# Patient Record
Sex: FEMALE | Race: WHITE | NOT HISPANIC OR LATINO | Employment: FULL TIME | URBAN - METROPOLITAN AREA
[De-identification: names, ages, dates, MRNs, and addresses within clinical notes are randomized per-mention and may not be internally consistent; named-entity substitution may affect disease eponyms.]

---

## 2018-04-21 ENCOUNTER — WALK IN (OUTPATIENT)
Dept: URGENT CARE | Age: 18
End: 2018-04-21

## 2018-04-21 ENCOUNTER — IMAGING SERVICES (OUTPATIENT)
Dept: GENERAL RADIOLOGY | Age: 18
End: 2018-04-21
Attending: FAMILY MEDICINE

## 2018-04-21 VITALS
OXYGEN SATURATION: 100 % | HEART RATE: 58 BPM | RESPIRATION RATE: 16 BRPM | DIASTOLIC BLOOD PRESSURE: 60 MMHG | SYSTOLIC BLOOD PRESSURE: 98 MMHG | TEMPERATURE: 97.9 F

## 2018-04-21 DIAGNOSIS — M79.672 LEFT FOOT PAIN: Primary | ICD-10-CM

## 2018-04-21 DIAGNOSIS — M25.572 ACUTE LEFT ANKLE PAIN: ICD-10-CM

## 2018-04-21 DIAGNOSIS — M79.672 LEFT FOOT PAIN: ICD-10-CM

## 2018-04-21 PROCEDURE — 73610 X-RAY EXAM OF ANKLE: CPT | Performed by: RADIOLOGY

## 2018-04-21 PROCEDURE — 99214 OFFICE O/P EST MOD 30 MIN: CPT | Performed by: FAMILY MEDICINE

## 2018-04-21 PROCEDURE — 73630 X-RAY EXAM OF FOOT: CPT | Performed by: RADIOLOGY

## 2022-09-07 ENCOUNTER — WALK IN (OUTPATIENT)
Dept: URGENT CARE | Age: 22
End: 2022-09-07

## 2022-09-07 VITALS
HEART RATE: 54 BPM | RESPIRATION RATE: 18 BRPM | BODY MASS INDEX: 21.69 KG/M2 | TEMPERATURE: 98.3 F | DIASTOLIC BLOOD PRESSURE: 70 MMHG | HEIGHT: 66 IN | OXYGEN SATURATION: 100 % | SYSTOLIC BLOOD PRESSURE: 114 MMHG | WEIGHT: 135 LBS

## 2022-09-07 DIAGNOSIS — J06.9 UPPER RESPIRATORY TRACT INFECTION, UNSPECIFIED TYPE: ICD-10-CM

## 2022-09-07 DIAGNOSIS — Z20.822 SUSPECTED COVID-19 VIRUS INFECTION: Primary | ICD-10-CM

## 2022-09-07 LAB
FLUAV RNA RESP QL NAA+PROBE: NOT DETECTED
FLUBV RNA RESP QL NAA+PROBE: NOT DETECTED
SARS-COV-2 N GENE CT SPEC QN NAA N2: ABNORMAL
SARS-COV-2 RNA RESP QL NAA+PROBE: DETECTED
SERVICE CMNT-IMP: ABNORMAL

## 2022-09-07 PROCEDURE — 99203 OFFICE O/P NEW LOW 30 MIN: CPT | Performed by: PHYSICIAN ASSISTANT

## 2022-09-07 PROCEDURE — 0240U SARS-COV-2/INFLUENZA BY PCR: CPT | Performed by: INTERNAL MEDICINE

## 2022-09-07 RX ORDER — BENZONATATE 200 MG/1
200 CAPSULE ORAL 3 TIMES DAILY PRN
Qty: 30 CAPSULE | Refills: 0 | Status: SHIPPED | OUTPATIENT
Start: 2022-09-07

## 2023-01-30 ENCOUNTER — WALK IN (OUTPATIENT)
Dept: URGENT CARE | Age: 23
End: 2023-01-30

## 2023-01-30 ENCOUNTER — IMAGING SERVICES (OUTPATIENT)
Dept: GENERAL RADIOLOGY | Age: 23
End: 2023-01-30
Attending: FAMILY MEDICINE

## 2023-01-30 VITALS
TEMPERATURE: 97.4 F | SYSTOLIC BLOOD PRESSURE: 98 MMHG | OXYGEN SATURATION: 98 % | BODY MASS INDEX: 21.69 KG/M2 | HEIGHT: 66 IN | WEIGHT: 135 LBS | HEART RATE: 55 BPM | RESPIRATION RATE: 16 BRPM | DIASTOLIC BLOOD PRESSURE: 60 MMHG

## 2023-01-30 DIAGNOSIS — M79.671 RIGHT FOOT PAIN: ICD-10-CM

## 2023-01-30 DIAGNOSIS — M79.671 RIGHT FOOT PAIN: Primary | ICD-10-CM

## 2023-01-30 PROCEDURE — 99214 OFFICE O/P EST MOD 30 MIN: CPT | Performed by: FAMILY MEDICINE

## 2023-01-30 PROCEDURE — 73630 X-RAY EXAM OF FOOT: CPT | Performed by: RADIOLOGY

## 2023-01-30 RX ORDER — ALBUTEROL SULFATE 90 UG/1
AEROSOL, METERED RESPIRATORY (INHALATION)
COMMUNITY
Start: 2022-12-29

## 2023-01-30 ASSESSMENT — PAIN SCALES - GENERAL: PAINLEVEL: 8

## 2024-09-10 ENCOUNTER — TELEPHONE (OUTPATIENT)
Dept: ORTHOPEDICS | Age: 24
End: 2024-09-10

## 2024-09-10 ENCOUNTER — APPOINTMENT (OUTPATIENT)
Dept: ORTHOPEDICS | Age: 24
End: 2024-09-10

## 2024-09-10 ASSESSMENT — ENCOUNTER SYMPTOMS
NERVOUS/ANXIOUS: 0
CONSTIPATION: 0
SHORTNESS OF BREATH: 0
SINUS PAIN: 0
RHINORRHEA: 0
CHILLS: 0
CHOKING: 0
NAUSEA: 0
BLOOD IN STOOL: 0
EYE REDNESS: 0
UNEXPECTED WEIGHT CHANGE: 0
NUMBNESS: 0
DIZZINESS: 0
PHOTOPHOBIA: 0
DIAPHORESIS: 0
BACK PAIN: 0
ACTIVITY CHANGE: 0
AGITATION: 0
CHEST TIGHTNESS: 0
SPEECH DIFFICULTY: 0
ABDOMINAL PAIN: 0
COUGH: 0
EYE PAIN: 0
HEADACHES: 0
FATIGUE: 0

## 2024-09-12 ENCOUNTER — OFFICE VISIT (OUTPATIENT)
Dept: SPORTS MEDICINE | Age: 24
End: 2024-09-12

## 2024-09-12 ENCOUNTER — IMAGING SERVICES (OUTPATIENT)
Dept: GENERAL RADIOLOGY | Age: 24
End: 2024-09-12
Attending: FAMILY MEDICINE

## 2024-09-12 ENCOUNTER — HOSPITAL ENCOUNTER (OUTPATIENT)
Age: 24
Discharge: HOME | End: 2024-09-12
Payer: COMMERCIAL

## 2024-09-12 VITALS
HEART RATE: 61 BPM | OXYGEN SATURATION: 100 % | DIASTOLIC BLOOD PRESSURE: 71 MMHG | TEMPERATURE: 98 F | RESPIRATION RATE: 18 BRPM | SYSTOLIC BLOOD PRESSURE: 137 MMHG

## 2024-09-12 VITALS
DIASTOLIC BLOOD PRESSURE: 82 MMHG | SYSTOLIC BLOOD PRESSURE: 130 MMHG | WEIGHT: 126 LBS | HEART RATE: 54 BPM | BODY MASS INDEX: 20.25 KG/M2 | HEIGHT: 66 IN

## 2024-09-12 DIAGNOSIS — M84.359A STRESS FRACTURE OF HIP, INITIAL ENCOUNTER: Primary | ICD-10-CM

## 2024-09-12 DIAGNOSIS — M25.551 BILATERAL HIP PAIN: ICD-10-CM

## 2024-09-12 DIAGNOSIS — M53.3 COCCYX PAIN: Primary | ICD-10-CM

## 2024-09-12 DIAGNOSIS — M25.552 BILATERAL HIP PAIN: ICD-10-CM

## 2024-09-12 PROCEDURE — 73522 X-RAY EXAM HIPS BI 3-4 VIEWS: CPT | Performed by: FAMILY MEDICINE

## 2024-09-12 PROCEDURE — 99204 OFFICE O/P NEW MOD 45 MIN: CPT | Performed by: FAMILY MEDICINE

## 2024-09-12 PROCEDURE — 99204 OFFICE O/P NEW MOD 45 MIN: CPT | Performed by: NURSE PRACTITIONER

## 2024-09-12 RX ORDER — HYDROCODONE BITARTRATE AND ACETAMINOPHEN 5; 325 MG/1; MG/1
1 TABLET ORAL EVERY 6 HOURS PRN
Qty: 10 TABLET | Refills: 0 | Status: SHIPPED | OUTPATIENT
Start: 2024-09-12

## 2024-09-12 RX ORDER — METHYLPREDNISOLONE 4 MG
TABLET, DOSE PACK ORAL
Qty: 1 EACH | Refills: 0 | Status: SHIPPED | OUTPATIENT
Start: 2024-09-12

## 2024-09-12 NOTE — ED PROVIDER NOTES
Patient Seen in: [facility]      History     Chief Complaint   Patient presents with    Pain     Stated Complaint: lower back pain/tailbone    Subjective:   24-year-old female presents today with complaints of pain to the coccyx area.  Was seen at another facility earlier today and did have an x-ray to the coccyx area but system was down and states cannot get prescriptions for medication.  Results of the x-ray was given to her through [employer].  Continues to have pain has been trying over the counter medications without any relief.  States symptoms started after running denies any injuries to the area.  No history of falls.            Objective:   No pertinent past medical history.            No pertinent past surgical history.              No pertinent social history.            Review of Systems    Positive for stated Chief Complaint: Pain    Other systems are as noted in HPI.  Constitutional and vital signs reviewed.      All other systems reviewed and negative except as noted above.    Physical Exam     ED Triage Vitals [09/12/24 1848]   /71   Pulse 61   Resp 18   Temp 98.1 °F (36.7 °C)   Temp src Temporal   SpO2 100 %   O2 Device None (Room air)       Current Vitals:   Vital Signs  BP: 137/71  Pulse: 61  Resp: 18  Temp: 98.1 °F (36.7 °C)  Temp src: Temporal    Oxygen Therapy  SpO2: 100 %  O2 Device: None (Room air)            Physical Exam  Vitals and nursing note reviewed.   Constitutional:       Appearance: Normal appearance.   HENT:      Head: Normocephalic.      Mouth/Throat:      Mouth: Mucous membranes are moist.   Cardiovascular:      Rate and Rhythm: Normal rate.   Pulmonary:      Effort: Pulmonary effort is normal.   Musculoskeletal:      Comments: Mild pain with palpation over the coccyx area no surrounding redness or swelling no obvious abscess or pilonidal cyst noted.   Skin:     General: Skin is warm and dry.   Neurological:      Mental Status: She is alert and oriented to person,  place, and time.               ED Course   Labs Reviewed - No data to display                 MDM                                      Medical Decision Making  Differential diagnosis includes but is not limited to: Coccyx fracture, coccyx strain, pilonidal cyst, abscess        Presents today with complaints of pain to the coccyx area without specific injury.  Symptoms started after a long run.  On exam no signs of infection or pilonidal cyst noted.  He does have some tenderness with palpation no fluctuance.  X-ray was done earlier today which was normal.  Patient here for pain control.  Patient given prescription of Medrol dose pack.  Patient courage to take over-the-counter naproxen low-dose Tylenol prescription for Norco was given with discussion on side effects and addictive properties.  Patient follow-up primary care physician 1 week if symptoms do not improve.  Patient verbalized understanding and agreed with plan of care.    Risk  OTC drugs.  Prescription drug management.        Disposition and Plan     Clinical Impression:  1. Coccyx pain         Disposition:  Discharge  9/12/2024  6:55 pm    Follow-up:  Sabrina Segura MD  [address] [id number] [phone]    In 1 week  As needed          Medications Prescribed:  Current Discharge Medication List        START taking these medications    Details   methylPREDNISolone (MEDROL) 4 MG Oral Tablet Therapy Pack Dosepack: take as directed  Qty: 1 each, Refills: 0      HYDROcodone-acetaminophen 5-325 MG Oral Tab Take 1 tablet by mouth every 6 (six) hours as needed for Pain.  Qty: 10 tablet, Refills: 0    Associated Diagnoses: Coccyx pain

## 2024-09-12 NOTE — ED INITIAL ASSESSMENT (HPI)
Pt here w/ c/o tailbone pain. Pt was jogging and was [facility] but their systems were down do she was unable to get anything for pain control. States ibuprofen isnt controlling it.

## 2024-09-12 NOTE — DISCHARGE INSTRUCTIONS
Ice to area of soreness.  Continue take over-the-counter ibuprofen or naproxen for pain and swelling.  Take Medrol Dosepak in the morning with food to help with inflammation.  Take naproxen and Medrol Dosepak at least 2 hours apart.  Again take both with food.  Rest.  Do not run for at least 1 week.  Take Norco for acute pain.  Norco is an opiate therefore addictive so use sparingly.  Can also cause constipation so use a stool softener and do not drive while taking.

## 2024-10-26 ENCOUNTER — OFFICE VISIT (OUTPATIENT)
Facility: CLINIC | Age: 24
End: 2024-10-26
Payer: COMMERCIAL

## 2024-10-26 VITALS
HEART RATE: 56 BPM | WEIGHT: 127 LBS | HEIGHT: 66 IN | SYSTOLIC BLOOD PRESSURE: 98 MMHG | BODY MASS INDEX: 20.41 KG/M2 | DIASTOLIC BLOOD PRESSURE: 70 MMHG

## 2024-10-26 DIAGNOSIS — M62.89 PELVIC FLOOR DYSFUNCTION IN FEMALE: Primary | ICD-10-CM

## 2024-10-26 PROCEDURE — 99202 OFFICE O/P NEW SF 15 MIN: CPT

## 2024-10-26 PROCEDURE — 3078F DIAST BP <80 MM HG: CPT

## 2024-10-26 PROCEDURE — 3074F SYST BP LT 130 MM HG: CPT

## 2024-10-26 PROCEDURE — 3008F BODY MASS INDEX DOCD: CPT

## 2024-10-26 NOTE — PROGRESS NOTES
Ema Mejias is a 24 year old female  Patient's last menstrual period was 10/24/2024 (exact date).   Chief Complaint   Patient presents with    New Patient     Establish care     Gyn Problem    Pelvic Pain     Has been having pelvic pain for a few months. Tried physical therapy and that didn't help much    .  After she ran the [place] this year, she is having pain from her buttocks to her vagina. Is seeing physical therapy for her lower back.   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    Congenital absence of hand    left    Extrinsic asthma, unspecified    Osteoporosis    Left hip/sacrum       SURGICAL HISTORY:  Past Surgical History:   Procedure Laterality Date    Other surgical history  adenoidectomy    Other surgical history  adenoidectomy       SOCIAL HISTORY:  Social History     Socioeconomic History    Marital status: Single     Spouse name: Not on file    Number of children: Not on file    Years of education: Not on file    Highest education level: Not on file   Occupational History    Not on file   Tobacco Use    Smoking status: Never    Smokeless tobacco: Never   Substance and Sexual Activity    Alcohol use: No    Drug use: No    Sexual activity: Not on file   Other Topics Concern    Caffeine Concern No    Exercise Yes     Comment: Marathon runner    Seat Belt No    Special Diet No    Stress Concern No    Weight Concern No   Social History Narrative    Not on file     Social Drivers of Health     Financial Resource Strain: Not on file   Food Insecurity: Low Risk  (2023)    Received from [facility]    Food Insecurity     Have there been times  that your food ran out, and you didn't have money to get more?: No     Are there times that you worry that this might happen?: No   Transportation Needs: Low Risk  (9/4/2023)    Received from [facility]    Transportation Needs     Do you have trouble getting transportation to medical appointments?: No     How do you normally get to and from your appointments?: Other   Physical Activity: Not on file   Stress: Not on file   Social Connections: Unknown (8/25/2024)    Received from [facility]    Social Connections     Social Connections: Last Flowsheet Data: Not on file     Social Connections: Recent Result: Not on file   Housing Stability: Not on file (9/4/2023)       FAMILY HISTORY:  History reviewed. No pertinent family history.    MEDICATIONS:    Current Outpatient Medications:     methylPREDNISolone (MEDROL) 4 MG Oral Tablet Therapy Pack, Dosepack: take as directed, Disp: 1 each, Rfl: 0    HYDROcodone-acetaminophen 5-325 MG Oral Tab, Take 1 tablet by mouth every 6 (six) hours as needed for Pain., Disp: 10 tablet, Rfl: 0    ALLERGIES:  Allergies[1]      PHYSICAL EXAM:   Pelvic Exam:  External Genitalia: normal appearance, hair distribution, and no lesions  Urethral Meatus:  normal in size, location, without lesions and prolapse  Bladder:  No fullness, masses or tenderness  Vagina:  Normal appearance without lesions, no abnormal discharge  Cervix:  Normal without tenderness on motion  Uterus: normal in size, contour, position, mobility, without tenderness  Adnexa: normal without masses or tenderness  Perineum: normal  Anus: no hemorroids     Assessment & Plan:    1. Pelvic floor dysfunction in female    - Pelvic Floor Therapy - [facility] - External               [1]   Allergies  Allergen Reactions    Penicillins HIVES

## 2024-12-08 ENCOUNTER — HOSPITAL ENCOUNTER (OUTPATIENT)
Age: 24
Discharge: HOME | End: 2024-12-08
Payer: COMMERCIAL

## 2024-12-08 VITALS
HEART RATE: 50 BPM | SYSTOLIC BLOOD PRESSURE: 125 MMHG | BODY MASS INDEX: 20.25 KG/M2 | WEIGHT: 126 LBS | HEIGHT: 66 IN | RESPIRATION RATE: 18 BRPM | DIASTOLIC BLOOD PRESSURE: 80 MMHG | TEMPERATURE: 98 F | OXYGEN SATURATION: 100 %

## 2024-12-08 DIAGNOSIS — M54.31 SCIATICA OF RIGHT SIDE: Primary | ICD-10-CM

## 2024-12-08 RX ORDER — PREDNISONE 20 MG/1
60 TABLET ORAL ONCE
Status: COMPLETED | OUTPATIENT
Start: 2024-12-08 | End: 2024-12-08

## 2024-12-08 RX ORDER — METHYLPREDNISOLONE 4 MG/1
TABLET ORAL
Qty: 1 EACH | Refills: 0 | Status: SHIPPED | OUTPATIENT
Start: 2024-12-08

## 2024-12-08 NOTE — ED INITIAL ASSESSMENT (HPI)
Right glute and hip pain for a couple of months. This is an ongoing issue for patient. Patient has had an MRI and xray done. Pt is currently in PT. Last visit was last Thursday. Pain is getting worse since Tuesday. Rates it 9/10 right now.

## 2024-12-08 NOTE — ED PROVIDER NOTES
Patient Seen in: [facility]      History     Chief Complaint   Patient presents with    Hip Pain     Stated Complaint: glute and pelvis pain    Subjective:   HPI      Pt is a 24yr old female with asthma, who is being treated for a glutal pain that started a few months ago.  Has been seen by pmd, orthopedics, and was doing pt.  Started having pain Tuesday that was worsening.  Was at PT Thursday and given exercises to do.  She reports that the pain is not getting better.     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    Congenital absence of hand    left    Extrinsic asthma, unspecified    Osteoporosis    Left hip/sacrum              Past Surgical History:   Procedure Laterality Date    Other surgical history  adenoidectomy    Other surgical history  adenoidectomy                Social History     Socioeconomic History    Marital status: Single   Tobacco Use    Smoking status: Never    Smokeless tobacco: Never   Substance and Sexual Activity    Alcohol use: No    Drug use: No   Other Topics Concern    Caffeine Concern No    Exercise Yes     Comment: Marathon runner    Seat Belt No    Special Diet No    Stress Concern No    Weight Concern No     Social Drivers of Health     Food Insecurity: Low Risk  (9/4/2023)    Received from [facility]    Food Insecurity     Have there been times that your food ran out, and you didn't have money to get more?: No     Are there times that you worry that this might happen?: No   Transportation Needs: Low Risk  (9/4/2023)    Received from [facility]    Transportation Needs     Do you have trouble getting transportation to medical appointments?: No     How do you normally get to and from your appointments?: Other    Received from [facility]    Social Connections              Review of Systems    Positive for stated complaint: glute and pelvis  pain  Other systems are as noted in HPI.  Constitutional and vital signs reviewed.      All other systems reviewed and negative except as noted above.    Physical Exam     ED Triage Vitals [12/08/24 1505]   /80   Pulse 50   Resp 18   Temp 97.5 °F (36.4 °C)   Temp src Oral   SpO2 100 %   O2 Device None (Room air)       Current Vitals:   Vital Signs  BP: 125/80  Pulse: 50  Resp: 18  Temp: 97.5 °F (36.4 °C)  Temp src: Oral    Oxygen Therapy  SpO2: 100 %  O2 Device: None (Room air)        Physical Exam  VS: Vital signs reviewed. O2 saturation within normal limits for this patient     General: Patient is awake and alert, oriented to person, place and time. Not in acute distress.      HEENT: Head is normocephalic atraumatic. Pupils reactive bilaterally.  EOMs intact.  No facial droop or slurred speech.  No oral pallor. Mucous membranes moist.      Neck: No cervical lymphadenopathy. No stridor. Supple. No meningsmus.      Heart: S1-S2.  Regular rate and rhythm.       Lungs: No respiratory distress noted    Abdomen: Soft, nontender, nondistended.  Active bowel sounds present.       Back: No CVA tenderness.     Extremities: No edema.  Pulses 2+ extremities.   Brisk capillary refill noted.      Skin: Normal skin turgor     CNS: Moves all 4 extremities.  Interacts appropriately.  No tremor.  No gait abnormality          ED Course   Labs Reviewed - No data to display         I have personally  reviewed available prior medical records for any recent pertinent discharge summaries/testing. Patient/family updated on results and plan, a verbalized understanding and agreement with the plan.  I explained to the patient that emergent conditions may arise and to go to the ER for new, worsening or any persistent conditions. I've explained the importance of taking all medicatons as prescribed, follow up, and return precuations,  All questions answered.    Please note that this report has been produced using speech recognition  software and may contain errors related to that system including, but not limited to, errors in grammar, punctuation, and spelling, as well as words and phrases that possibly may have been recognized inappropriately.  If there are any questions or concerns, contact the dictating provider for clarification.       MDM      Patient is well-appearing, well-hydrated, well-nourished, nontoxic, there is no distress noted.  Patient is ambulatory with a steady gait.  She has what appears to be a sciatica pain.  She has no back pain at this time.  It is all in her right [place].  Patient reports that she had this pain in the past, she was treated with prednisone which helped to improve the symptoms.  Patient reports that when she was seen at physical therapy on Thursday, they told her to continue doing exercises for the pain.  Denies any new injuries.  Patient discharged home in stable condition with no neurofocal deficits to take prednisone as directed.  Follow-up with primary care physician, sports medicine, physical therapy and return to the emergency department with any worsening symptoms or concerns        Medical Decision Making      Disposition and Plan     Clinical Impression:  1. Sciatica of right side         Disposition:  Discharge  12/8/2024  3:14 pm    Follow-up:  Sabrina Segura MD  [address]  [facility] [id number] [phone]                Medications Prescribed:  Current Discharge Medication List        START taking these medications    Details   !! methylPREDNISolone (MEDROL) 4 MG Oral Tablet Therapy Pack Dosepack: take as directed  Qty: 1 each, Refills: 0       !! - Potential duplicate medications found. Please discuss with provider.              Supplementary Documentation:

## 2025-02-10 NOTE — PROGRESS NOTES
Chief Complaint   Patient presents with    Physical     No concerns     Decline weight check          HPI  Ema Mejias is a 25 year old female here for new patient visit and  physical.    Last colon cancer screen:  n/a    Last mammo: -n/a    Last Pap: -due    Acute concerns: none    Discussed:  - diet: no red meat, 3 meal/day x 3 snack. 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    Congenital absence of hand    left    Extrinsic asthma, unspecified    Osteoporosis    Left hip/sacrum       Past Surgical History:   Procedure Laterality Date    Other surgical history  adenoidectomy    Other surgical history  adenoidectomy       Social History     Socioeconomic History    Marital status: Single   Tobacco Use    Smoking status: Never     Passive exposure: Never    Smokeless tobacco: Never   Vaping Use    Vaping status: Never Used   Substance and Sexual Activity    Alcohol use: No    Drug use: No   Other Topics Concern    Caffeine Concern No    Exercise Yes     Comment: Marathon runner    Seat Belt No    Special Diet No    Stress Concern No    Weight Concern No       History reviewed. No pertinent family history.     Medications Ordered Prior to Encounter[1]    Immunization History   Administered Date(s) Administered    Covid-19 Vaccine Pfizer 30 mcg/0.3 ml 02/22/2021, 03/15/2021    Flucelvax Influenza vaccine, trivalent (ccIIV3), 0.5mL IM 09/07/2024    Influenza 09/20/2011, 12/04/2012    Meningococcal-Menactra 06/23/2014     Pfizer Covid-19 Vaccine 30mcg/0.3ml 12yrs+ 09/07/2024    TD 08/22/2023, 08/22/2023    Tb Intradermal Test 12/11/2016, 11/10/2020            Objective  Vitals:    02/11/25 1536   BP: 118/56   Pulse: 51   Resp: 16   Temp: 97.2 °F (36.2 °C)   SpO2: 98%   Height: 5' 6\" (1.676 m)   Body mass index is 20.34 kg/m².    Physical Exam  Constitutional:       Appearance: Normal appearance.   HEENT:      Head: Normocephalic and atraumatic.      Eyes: PERRLA no notable nystagmus     Ears: normal on observation     Nose: Nose normal.      Mouth: Mucous membranes are moist.      Neck: no lymphadenopathy  Cardiovascular:      Rate and Rhythm: Normal rate and regular rhythm.   Pulmonary:      Effort: Pulmonary effort is normal.      Breath sounds: Normal breath sounds.   Abdominal:      General: Bowel sounds are normal.      Palpations: Abdomen is soft. There is no mass.   Musculoskeletal:         General: Normal range of motion.   Skin:     General: Skin is warm and dry.   Neurological:      General: No focal deficit present.      Mental Status: Alert and oriented to person, place, and time.   Psychiatric:         Mood and Affect: Mood normal.         Thought Content: Thought content normal.       Assessment and Plan  Ema was seen today for physical.    Diagnoses and all orders for this visit:    Annual physical exam  -     TSH W Reflex To Free T4; Future  -     Lipid Panel; Future  -     Comp Metabolic Panel (14); Future  -     CBC With Differential With Platelet; Future    Generalized anxiety disorder  Comments:  GAD7: 15.Cont weekly therapy seesions   Start zoloft 25mg daily, discussed med benefit/risk/alternatives  Pt tolerated zoloft in the past, adjust dose as needed  Orders:  -     sertraline (ZOLOFT) 25 MG Oral Tab; Take 1 tablet (25 mg total) by mouth daily.    Iron deficiency anemia, unspecified iron deficiency anemia type  -     Iron And Tibc [E]; Future  -     Ferritin [E]; Future    Anorexia nervosa  (HCC)  Comments:  Stable, sees dietitian and therapist  No compensatory behaviors  Follow up with psychiatrist as discussed    Mild intermittent asthma without complication (HCC)  Comments:  Stable, no concerns    Congenital deformities of feet  Comments:  Stable, no concerns    Congenital reduction deformity of upper limb  Comments:  Stable, no concerns    Cervical cancer screening  Comments:  return for cervical cancer screening         Patient presents for annual exam  - General labs: ordered, awaiting results  - Discussed signing up for patient portal as means of communicating with me directly  - Discussed importance of communicating with me if has not heard back with results, etc  - Discussed age-appropriate vaccinations and screenings  - Pt verbalizes understanding, all questions/concerns addressed, in agreement w/plan    Follow up  Return in about 4 weeks (around 3/11/2025) for medication follow up.      Patient Instructions  Patient Instructions   Obtain labs  Methods to manage anxiety including: journaling, exercise, meditation, and deep breathing techniques.  Start Zoloft 25mg daily as discussed   Follow up for papsmear as needed          Gay Junior MD          [1]   No current outpatient medications on file prior to visit.     No current facility-administered medications on file prior to visit.

## 2025-02-11 ENCOUNTER — OFFICE VISIT (OUTPATIENT)
Dept: FAMILY MEDICINE CLINIC | Facility: CLINIC | Age: 25
End: 2025-02-11
Payer: COMMERCIAL

## 2025-02-11 VITALS
SYSTOLIC BLOOD PRESSURE: 118 MMHG | BODY MASS INDEX: 20 KG/M2 | DIASTOLIC BLOOD PRESSURE: 56 MMHG | RESPIRATION RATE: 16 BRPM | OXYGEN SATURATION: 98 % | TEMPERATURE: 97 F | HEIGHT: 66 IN | HEART RATE: 51 BPM

## 2025-02-11 DIAGNOSIS — Q71.90: ICD-10-CM

## 2025-02-11 DIAGNOSIS — Z00.00 ANNUAL PHYSICAL EXAM: Primary | ICD-10-CM

## 2025-02-11 DIAGNOSIS — Q66.90: ICD-10-CM

## 2025-02-11 DIAGNOSIS — F41.1 GENERALIZED ANXIETY DISORDER: ICD-10-CM

## 2025-02-11 DIAGNOSIS — F50.00 ANOREXIA NERVOSA (HCC): ICD-10-CM

## 2025-02-11 DIAGNOSIS — J45.20 MILD INTERMITTENT ASTHMA WITHOUT COMPLICATION (HCC): ICD-10-CM

## 2025-02-11 DIAGNOSIS — D50.9 IRON DEFICIENCY ANEMIA, UNSPECIFIED IRON DEFICIENCY ANEMIA TYPE: ICD-10-CM

## 2025-02-11 DIAGNOSIS — Z12.4 CERVICAL CANCER SCREENING: ICD-10-CM

## 2025-02-11 PROBLEM — F41.9 ANXIETY: Status: ACTIVE | Noted: 2023-03-21

## 2025-02-11 PROBLEM — J45.909 ASTHMA (HCC): Status: ACTIVE | Noted: 2022-11-07

## 2025-02-11 PROCEDURE — 99385 PREV VISIT NEW AGE 18-39: CPT

## 2025-02-11 PROCEDURE — 3074F SYST BP LT 130 MM HG: CPT

## 2025-02-11 PROCEDURE — 99213 OFFICE O/P EST LOW 20 MIN: CPT

## 2025-02-11 PROCEDURE — 3078F DIAST BP <80 MM HG: CPT

## 2025-02-11 RX ORDER — SERTRALINE HYDROCHLORIDE 25 MG/1
25 TABLET, FILM COATED ORAL DAILY
Qty: 30 TABLET | Refills: 0 | Status: SHIPPED | OUTPATIENT
Start: 2025-02-11

## 2025-02-11 NOTE — PATIENT INSTRUCTIONS
Obtain labs  Methods to manage anxiety including: journaling, exercise, meditation, and deep breathing techniques.  Start Zoloft 25mg daily as discussed   Follow up for papsmear as needed

## 2025-03-14 ENCOUNTER — TELEPHONE (OUTPATIENT)
Dept: FAMILY MEDICINE CLINIC | Facility: CLINIC | Age: 25
End: 2025-03-14

## 2025-04-14 ENCOUNTER — PATIENT OUTREACH (OUTPATIENT)
Dept: FAMILY MEDICINE CLINIC | Facility: CLINIC | Age: 25
End: 2025-04-14

## 2025-05-28 ENCOUNTER — LAB ENCOUNTER (OUTPATIENT)
Dept: LAB | Age: 25
End: 2025-05-28
Payer: COMMERCIAL

## 2025-05-28 DIAGNOSIS — D50.9 IRON DEFICIENCY ANEMIA, UNSPECIFIED IRON DEFICIENCY ANEMIA TYPE: ICD-10-CM

## 2025-05-28 DIAGNOSIS — Z00.00 ANNUAL PHYSICAL EXAM: ICD-10-CM

## 2025-05-28 PROBLEM — F41.1 GENERALIZED ANXIETY DISORDER: Status: ACTIVE | Noted: 2025-05-28

## 2025-05-28 LAB
ALBUMIN SERPL-MCNC: 4.6 G/DL (ref 3.2–4.8)
ALBUMIN/GLOB SERPL: 2 {RATIO} (ref 1–2)
ALP LIVER SERPL-CCNC: 37 U/L (ref 37–98)
ALT SERPL-CCNC: 15 U/L (ref 10–49)
ANION GAP SERPL CALC-SCNC: 8 MMOL/L (ref 0–18)
AST SERPL-CCNC: 18 U/L (ref ?–34)
BASOPHILS # BLD AUTO: 0.08 X10(3) UL (ref 0–0.2)
BASOPHILS NFR BLD AUTO: 0.8 %
BILIRUB SERPL-MCNC: 0.2 MG/DL (ref 0.3–1.2)
BUN BLD-MCNC: 17 MG/DL (ref 9–23)
CALCIUM BLD-MCNC: 9.7 MG/DL (ref 8.7–10.6)
CHLORIDE SERPL-SCNC: 105 MMOL/L (ref 98–112)
CHOLEST SERPL-MCNC: 140 MG/DL (ref ?–200)
CO2 SERPL-SCNC: 28 MMOL/L (ref 21–32)
CREAT BLD-MCNC: 0.75 MG/DL (ref 0.55–1.02)
DEPRECATED HBV CORE AB SER IA-ACNC: 24 NG/ML (ref 50–306)
EGFRCR SERPLBLD CKD-EPI 2021: 112 ML/MIN/1.73M2 (ref 60–?)
EOSINOPHIL # BLD AUTO: 1.89 X10(3) UL (ref 0–0.7)
EOSINOPHIL NFR BLD AUTO: 20 %
ERYTHROCYTE [DISTWIDTH] IN BLOOD BY AUTOMATED COUNT: 11.9 %
FASTING PATIENT LIPID ANSWER: YES
FASTING STATUS PATIENT QL REPORTED: YES
GLOBULIN PLAS-MCNC: 2.3 G/DL (ref 2–3.5)
GLUCOSE BLD-MCNC: 84 MG/DL (ref 70–99)
HCT VFR BLD AUTO: 37.8 % (ref 35–48)
HDLC SERPL-MCNC: 48 MG/DL (ref 40–59)
HGB BLD-MCNC: 12.9 G/DL (ref 12–16)
IMM GRANULOCYTES # BLD AUTO: 0.02 X10(3) UL (ref 0–1)
IMM GRANULOCYTES NFR BLD: 0.2 %
IRON SATN MFR SERPL: 21 % (ref 15–50)
IRON SERPL-MCNC: 70 UG/DL (ref 50–170)
LDLC SERPL CALC-MCNC: 79 MG/DL (ref ?–100)
LYMPHOCYTES # BLD AUTO: 2.15 X10(3) UL (ref 1–4)
LYMPHOCYTES NFR BLD AUTO: 22.7 %
MCH RBC QN AUTO: 31.2 PG (ref 26–34)
MCHC RBC AUTO-ENTMCNC: 34.1 G/DL (ref 31–37)
MCV RBC AUTO: 91.3 FL (ref 80–100)
MONOCYTES # BLD AUTO: 0.58 X10(3) UL (ref 0.1–1)
MONOCYTES NFR BLD AUTO: 6.1 %
NEUTROPHILS # BLD AUTO: 4.74 X10 (3) UL (ref 1.5–7.7)
NEUTROPHILS # BLD AUTO: 4.74 X10(3) UL (ref 1.5–7.7)
NEUTROPHILS NFR BLD AUTO: 50.2 %
NONHDLC SERPL-MCNC: 92 MG/DL (ref ?–130)
OSMOLALITY SERPL CALC.SUM OF ELEC: 293 MOSM/KG (ref 275–295)
PLATELET # BLD AUTO: 160 10(3)UL (ref 150–450)
POTASSIUM SERPL-SCNC: 3.8 MMOL/L (ref 3.5–5.1)
PROT SERPL-MCNC: 6.9 G/DL (ref 5.7–8.2)
RBC # BLD AUTO: 4.14 X10(6)UL (ref 3.8–5.3)
SODIUM SERPL-SCNC: 141 MMOL/L (ref 136–145)
TOTAL IRON BINDING CAPACITY: 330 UG/DL (ref 250–425)
TRANSFERRIN SERPL-MCNC: 241 MG/DL (ref 250–380)
TRIGL SERPL-MCNC: 60 MG/DL (ref 30–149)
TSI SER-ACNC: 1.93 UIU/ML (ref 0.55–4.78)
VLDLC SERPL CALC-MCNC: 9 MG/DL (ref 0–30)
WBC # BLD AUTO: 9.5 X10(3) UL (ref 4–11)

## 2025-05-28 PROCEDURE — 82728 ASSAY OF FERRITIN: CPT

## 2025-05-28 PROCEDURE — 83550 IRON BINDING TEST: CPT

## 2025-05-28 PROCEDURE — 80061 LIPID PANEL: CPT

## 2025-05-28 PROCEDURE — 80050 GENERAL HEALTH PANEL: CPT

## 2025-05-28 PROCEDURE — 83540 ASSAY OF IRON: CPT

## 2025-05-30 ENCOUNTER — PATIENT MESSAGE (OUTPATIENT)
Dept: FAMILY MEDICINE CLINIC | Facility: CLINIC | Age: 25
End: 2025-05-30

## 2025-05-31 NOTE — TELEPHONE ENCOUNTER
----- Message from Gay Junior sent at 5/30/2025  3:40 PM CDT -----  Mychart message sent:  CBC shows no anemia, eosinophil is elevated d/t allergies or inflammation  Iron level is normal; ferritin is low (continue iron supplements)  CMP shows normal electrolytes, kidney function and liver enzymes  Hemoglobin A1C shows no diabetes  Thyroid function is normal  Lipid panel shows normal cholesterol levels    ----- Message -----  From: Lab, Background User  Sent: 5/28/2025   9:55 PM CDT  To: Gay Junior MD

## 2025-06-07 ENCOUNTER — APPOINTMENT (OUTPATIENT)
Dept: CT IMAGING | Age: 25
End: 2025-06-07
Attending: EMERGENCY MEDICINE
Payer: COMMERCIAL

## 2025-06-07 ENCOUNTER — HOSPITAL ENCOUNTER (EMERGENCY)
Age: 25
Discharge: HOME | End: 2025-06-08
Attending: EMERGENCY MEDICINE
Payer: COMMERCIAL

## 2025-06-07 DIAGNOSIS — R10.9 ABDOMINAL PAIN OF UNKNOWN ETIOLOGY: Primary | ICD-10-CM

## 2025-06-07 LAB
ALBUMIN SERPL-MCNC: 3.9 G/DL (ref 3.2–4.8)
ALBUMIN/GLOB SERPL: 2 {RATIO} (ref 1–2)
ALP LIVER SERPL-CCNC: 34 U/L (ref 37–98)
ALT SERPL-CCNC: 13 U/L (ref 10–49)
ANION GAP SERPL CALC-SCNC: 6 MMOL/L (ref 0–18)
AST SERPL-CCNC: 17 U/L (ref ?–34)
B-HCG UR QL: NEGATIVE
BASOPHILS # BLD AUTO: 0.07 X10(3) UL (ref 0–0.2)
BASOPHILS NFR BLD AUTO: 0.9 %
BILIRUB SERPL-MCNC: 0.3 MG/DL (ref 0.3–1.2)
BILIRUB UR QL STRIP.AUTO: NEGATIVE
BUN BLD-MCNC: 17 MG/DL (ref 9–23)
CALCIUM BLD-MCNC: 8.6 MG/DL (ref 8.7–10.6)
CHLORIDE SERPL-SCNC: 106 MMOL/L (ref 98–112)
CLARITY UR REFRACT.AUTO: CLEAR
CO2 SERPL-SCNC: 27 MMOL/L (ref 21–32)
COLOR UR AUTO: YELLOW
CREAT BLD-MCNC: 0.78 MG/DL (ref 0.55–1.02)
EGFRCR SERPLBLD CKD-EPI 2021: 107 ML/MIN/1.73M2 (ref 60–?)
EOSINOPHIL # BLD AUTO: 2.12 X10(3) UL (ref 0–0.7)
EOSINOPHIL NFR BLD AUTO: 26.8 %
ERYTHROCYTE [DISTWIDTH] IN BLOOD BY AUTOMATED COUNT: 12.2 %
GLOBULIN PLAS-MCNC: 2 G/DL (ref 2–3.5)
GLUCOSE BLD-MCNC: 104 MG/DL (ref 70–99)
GLUCOSE UR STRIP.AUTO-MCNC: NEGATIVE MG/DL
HCT VFR BLD AUTO: 34.7 % (ref 35–48)
HGB BLD-MCNC: 11.9 G/DL (ref 12–16)
IMM GRANULOCYTES # BLD AUTO: 0.01 X10(3) UL (ref 0–1)
IMM GRANULOCYTES NFR BLD: 0.1 %
KETONES UR STRIP.AUTO-MCNC: NEGATIVE MG/DL
LIPASE SERPL-CCNC: 45 U/L (ref 12–53)
LYMPHOCYTES # BLD AUTO: 2.56 X10(3) UL (ref 1–4)
LYMPHOCYTES NFR BLD AUTO: 32.3 %
MCH RBC QN AUTO: 31.9 PG (ref 26–34)
MCHC RBC AUTO-ENTMCNC: 34.3 G/DL (ref 31–37)
MCV RBC AUTO: 93 FL (ref 80–100)
MONOCYTES # BLD AUTO: 0.47 X10(3) UL (ref 0.1–1)
MONOCYTES NFR BLD AUTO: 5.9 %
NEUTROPHILS # BLD AUTO: 2.69 X10 (3) UL (ref 1.5–7.7)
NEUTROPHILS # BLD AUTO: 2.69 X10(3) UL (ref 1.5–7.7)
NEUTROPHILS NFR BLD AUTO: 34 %
NITRITE UR QL STRIP.AUTO: NEGATIVE
OSMOLALITY SERPL CALC.SUM OF ELEC: 290 MOSM/KG (ref 275–295)
PH UR STRIP.AUTO: 7.5 [PH] (ref 5–8)
PLATELET # BLD AUTO: 186 10(3)UL (ref 150–450)
POTASSIUM SERPL-SCNC: 3.9 MMOL/L (ref 3.5–5.1)
PROT SERPL-MCNC: 5.9 G/DL (ref 5.7–8.2)
PROT UR STRIP.AUTO-MCNC: NEGATIVE MG/DL
RBC # BLD AUTO: 3.73 X10(6)UL (ref 3.8–5.3)
RBC UR QL AUTO: NEGATIVE
SODIUM SERPL-SCNC: 139 MMOL/L (ref 136–145)
SP GR UR STRIP.AUTO: 1.01 (ref 1–1.03)
UROBILINOGEN UR STRIP.AUTO-MCNC: 0.2 MG/DL
WBC # BLD AUTO: 7.9 X10(3) UL (ref 4–11)

## 2025-06-07 PROCEDURE — 87086 URINE CULTURE/COLONY COUNT: CPT | Performed by: EMERGENCY MEDICINE

## 2025-06-07 PROCEDURE — 96374 THER/PROPH/DIAG INJ IV PUSH: CPT

## 2025-06-07 PROCEDURE — 96361 HYDRATE IV INFUSION ADD-ON: CPT

## 2025-06-07 PROCEDURE — 74177 CT ABD & PELVIS W/CONTRAST: CPT | Performed by: EMERGENCY MEDICINE

## 2025-06-07 PROCEDURE — 85025 COMPLETE CBC W/AUTO DIFF WBC: CPT | Performed by: EMERGENCY MEDICINE

## 2025-06-07 PROCEDURE — 81001 URINALYSIS AUTO W/SCOPE: CPT | Performed by: EMERGENCY MEDICINE

## 2025-06-07 PROCEDURE — 99285 EMERGENCY DEPT VISIT HI MDM: CPT

## 2025-06-07 PROCEDURE — 99284 EMERGENCY DEPT VISIT MOD MDM: CPT

## 2025-06-07 PROCEDURE — 81025 URINE PREGNANCY TEST: CPT

## 2025-06-07 PROCEDURE — 80053 COMPREHEN METABOLIC PANEL: CPT | Performed by: EMERGENCY MEDICINE

## 2025-06-07 PROCEDURE — 83690 ASSAY OF LIPASE: CPT | Performed by: EMERGENCY MEDICINE

## 2025-06-07 PROCEDURE — 81015 MICROSCOPIC EXAM OF URINE: CPT | Performed by: EMERGENCY MEDICINE

## 2025-06-07 RX ORDER — KETOROLAC TROMETHAMINE 15 MG/ML
15 INJECTION, SOLUTION INTRAMUSCULAR; INTRAVENOUS ONCE
Status: COMPLETED | OUTPATIENT
Start: 2025-06-07 | End: 2025-06-08

## 2025-06-07 RX ORDER — SODIUM CHLORIDE 9 MG/ML
INJECTION, SOLUTION INTRAVENOUS CONTINUOUS
Status: DISCONTINUED | OUTPATIENT
Start: 2025-06-07 | End: 2025-06-08

## 2025-06-08 VITALS
WEIGHT: 130 LBS | RESPIRATION RATE: 14 BRPM | SYSTOLIC BLOOD PRESSURE: 119 MMHG | TEMPERATURE: 98 F | BODY MASS INDEX: 20.89 KG/M2 | HEART RATE: 52 BPM | HEIGHT: 66 IN | DIASTOLIC BLOOD PRESSURE: 67 MMHG | OXYGEN SATURATION: 100 %

## 2025-06-08 NOTE — ED PROVIDER NOTES
Patient Seen in: [facility] In [place]        History  Chief Complaint   Patient presents with    Abdomen/Flank Pain     Stated Complaint: right side ab pain, nausea    Subjective:   HPI            This is a 25-year-old female presents emergency room for evaluation of right-sided abdominal pain, states symptoms started earlier today, has had some nausea but denies vomiting.  Denies diarrhea or constipation.  States appetite slightly decreased.  Denies any fevers or chills.  Denies urinary symptoms.  Denies any tearing type chest or abdominal pain denies any trauma.  Denies vaginal bleeding or discharge.  Denies chest pain or shortness of breath.      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    Congenital absence of hand    left    Extrinsic asthma, unspecified    Osteoporosis    Left hip/sacrum              Past Surgical History:   Procedure Laterality Date    Other surgical history  adenoidectomy    Other surgical history  adenoidectomy                Social History     Socioeconomic History    Marital status: Single   Tobacco Use    Smoking status: Never     Passive exposure: Never    Smokeless tobacco: Never   Vaping Use    Vaping status: Never Used   Substance and Sexual Activity    Alcohol use: No    Drug use: No   Other Topics Concern    Caffeine Concern No    Exercise Yes     Comment: Marathon runner    Seat Belt No    Special Diet No    Stress Concern No    Weight Concern No     Social Drivers of Health     Food Insecurity: Low Risk  (9/4/2023)    Received from [facility]    Food Insecurity     Have there been times that your food ran out, and you didn't have money to get more?: No     Are there times that you worry that this might happen?: No   Transportation Needs: Low Risk  (9/4/2023)    Received from [facility]    Transportation Needs     Do you have trouble getting transportation to  medical appointments?: No     How do you normally get to and from your appointments?: Other                                Physical Exam    ED Triage Vitals [06/07/25 2209]   /80   Pulse 60   Resp 16   Temp 98 °F (36.7 °C)   Temp src Oral   SpO2 98 %   O2 Device None (Room air)       Current Vitals:   Vital Signs  BP: 119/67  Pulse: 52  Resp: 14  Temp: 98 °F (36.7 °C)  Temp src: Oral    Oxygen Therapy  SpO2: 100 %  O2 Device: None (Room air)            Physical Exam  GENERAL: Patient is awake, alert, well-appearing, in no acute distress.  HEENT:  no scleral icterus.  Mucous membranes are moist, oropharynx is clear  HEART: Regular rate and rhythm, no murmurs.  LUNGS: Clear to auscultation bilaterally.  No Rales, no rhonchi, no wheezing, no stridor.  ABDOMEN: Soft, nondistended, mild right sided tender, negative Colby sign, negative Rovsing sign, no lower abdominal/pelvic tenderness, bowel sounds are present, no rebound, no rigidity, no guarding.no pulsatile masses. No CVA tenderness  EXTREMITIES: No peripheral edema, no calf tenderness=            ED Course  Labs Reviewed   COMP METABOLIC PANEL (14) - Abnormal; Notable for the following components:       Result Value    Glucose 104 (*)     Calcium, Total 8.6 (*)     Alkaline Phosphatase 34 (*)     All other components within normal limits   CBC WITH DIFFERENTIAL WITH PLATELET - Abnormal; Notable for the following components:    RBC 3.73 (*)     HGB 11.9 (*)     HCT 34.7 (*)     Eosinophil Absolute 2.12 (*)     All other components within normal limits   URINALYSIS WITH CULTURE REFLEX - Abnormal; Notable for the following components:    Leukocyte Esterase Urine Trace (*)     Squamous Epi. Cells Moderate (*)     All other components within normal limits   UA MICROSCOPIC ONLY, URINE - Abnormal; Notable for the following components:    Squamous Epi. Cells Moderate (*)     All other components within normal limits   LIPASE - Normal   POCT PREGNANCY URINE - Normal    SCAN SLIDE   URINE CULTURE, ROUTINE                            MDM       Differential diagnosis before testing includes but not limited to appendicitis, mesenteric adenitis, colitis, cholecystitis, pancreatitis, pregnancy/ectopic pregnancy, which is a medical condition that poses a threat to life/function    Radiographic images  I personally reviewed the radiographs and my individual interpretation shows CT abdomen, no free air  I also reviewed the official reports that showed 1. Fluid-filled and prominent loops of nondilated small bowel.  The pattern can be seen with nonspecific enteritis.   2. No additional specific abnormality.     Course of Events during Emergency Room Visit include IV established, blood work obtained.  CBC white count 7.9 hemoglobin 11.9 platelet 186.  Chemistry was unremarkable lipase 45.  Urinalysis negative nitrate trace leukocyte esterase negative pregnancy test.  CT abdomen/pelvis performed, there was a fluid-filled and prominent loops of nondilated small bowel, can be seen with nonspecific enteritis.  Patient did receive Toradol, and reevaluation abdomen soft nonsurgical there is mild right-sided tenderness but patient reports she started to feel better.  I discussed with patient etiology of symptoms unclear, possibly viral.  I do recommend close follow-up with primary care, return to ER if any change or symptoms.  Patient agrees to plan was discharged in good condition with mother    Shared decision making was utilized       Discharge  I have discussed with the patient the results of test, differential diagnosis, treatment plan, warning signs and symptoms which should prompt immediate return.  They expressed understanding of these instructions and agrees to the following plan provided.  They were given written discharge instructions and agrees to return for any concerns and voiced understanding and all questions were answered.    Note to patient: The 21st Century Cures Act makes  medical notes like these available to patients in the interest of transparency. However, this is a medical document intended as peer to peer communication. It is written in medical language and may contain abbreviations or verbiage that are unfamiliar. It may appear blunt or direct. Medical documents are intended to carry relevant information, facts as evident, and the clinical opinion of the practitioner.                 Medical Decision Making      Disposition and Plan     Clinical Impression:  1. Abdominal pain of unknown etiology         Disposition:  Discharge  6/8/2025  1:04 am    Follow-up:  Gay Junior MD  [address] [address]  [phone]    Follow up in 2 day(s)            Medications Prescribed:  Discharge Medication List as of 6/8/2025  1:05 AM                Supplementary Documentation:

## 2025-06-08 NOTE — ED INITIAL ASSESSMENT (HPI)
Pt c/o R lower abd pain all day. States was nauseous last night. No vomiting. No fever. Diarrhea every morning x2 weeks.

## 2025-06-10 ENCOUNTER — OFFICE VISIT (OUTPATIENT)
Dept: FAMILY MEDICINE CLINIC | Facility: CLINIC | Age: 25
End: 2025-06-10
Payer: COMMERCIAL

## 2025-06-10 VITALS
BODY MASS INDEX: 21.08 KG/M2 | RESPIRATION RATE: 16 BRPM | DIASTOLIC BLOOD PRESSURE: 60 MMHG | HEART RATE: 51 BPM | WEIGHT: 131.19 LBS | OXYGEN SATURATION: 99 % | HEIGHT: 66 IN | SYSTOLIC BLOOD PRESSURE: 106 MMHG | TEMPERATURE: 98 F

## 2025-06-10 DIAGNOSIS — R10.9 ABDOMINAL PAIN, UNSPECIFIED ABDOMINAL LOCATION: Primary | ICD-10-CM

## 2025-06-10 DIAGNOSIS — T78.1XXA FOOD SENSITIVITY WITH GASTROINTESTINAL SYMPTOMS: ICD-10-CM

## 2025-06-10 DIAGNOSIS — R11.0 NAUSEA: ICD-10-CM

## 2025-06-10 PROCEDURE — 3074F SYST BP LT 130 MM HG: CPT

## 2025-06-10 PROCEDURE — 99213 OFFICE O/P EST LOW 20 MIN: CPT

## 2025-06-10 PROCEDURE — 3078F DIAST BP <80 MM HG: CPT

## 2025-06-10 PROCEDURE — 3008F BODY MASS INDEX DOCD: CPT

## 2025-06-10 RX ORDER — ONDANSETRON 4 MG/1
4 TABLET, FILM COATED ORAL EVERY 8 HOURS PRN
Qty: 20 TABLET | Refills: 2 | Status: SHIPPED | OUTPATIENT
Start: 2025-06-10

## 2025-06-10 NOTE — PATIENT INSTRUCTIONS
Recommend oral hydration, liquid diet and gradually resume solids as tolerated  Zofran for nausea  Return to clinic as needed

## 2025-06-10 NOTE — PROGRESS NOTES
Chief Complaint   Patient presents with    ER F/U     Seen at ER for abdominal pain   Nausea          HPI  Ema Mejias is a 25 year old F pt who presents today for ED follow up    Pt reports feeling nauseous on her drive home from [place] on 6/7. Later that day she started experiencing  worsening R sided abdominal pain that led her to the ED. She also reports experiencing diarrhea, with the diarrhea occurring a couple of times. The patient denies any blood in her stools and states that her stools are now formed. She continues to experience nausea and some abdominal pain. The patient reports no changes in her diet and denies eating or drinking anything unusual prior to the onset of her symptoms.    CTA/P showed nonspecific enteritis. Normal appendix    Pt is concerns about developing food sensitivity to the foods she eats regularly. She has no known history of foods allergies but would like to get tested for this.    ROS  As per HPI    Past Medical History[1]    Past Surgical History[2]    Social Hx on file[3]    Family History[4]     Medications Ordered Prior to Encounter[5]      Objective  Vitals:    06/10/25 1144   BP: 106/60   Pulse: 51   Resp: 16   Temp: 97.5 °F (36.4 °C)   SpO2: 99%   Weight: 131 lb 3.2 oz (59.5 kg)   Height: 5' 6\" (1.676 m)     Body mass index is 21.18 kg/m².    Physical Exam  Constitutional:       Appearance: Normal appearance.   HEENT:      Head: Normocephalic and atraumatic.   Cardiovascular:      Rate and Rhythm: Normal rate and regular rhythm.   Pulmonary:      Effort: Pulmonary effort is normal.      Breath sounds: Normal breath sounds.   Abdominal:      General: Bowel sounds are normal.      Palpations: Abdomen is soft. There is no mass. Umbilical area and RLQ TTP  Musculoskeletal:         General: Normal range of motion.   Skin:     General: Skin is warm and dry.   Neurological:      General: No focal deficit present.      Mental Status: Alert and oriented to person,  place, and time.   Psychiatric:         Mood and Affect: Mood normal.         Thought Content: Thought content normal.       Assessment and Plan  Ema was seen today for er f/u.    Diagnoses and all orders for this visit:    Abdominal pain, unspecified abdominal location  Comments:  CTA/P shows non specific enteritis   Discussed liquid diet and gradually resume solids as tolerated    Nausea  -     ondansetron (ZOFRAN) 4 mg tablet; Take 1 tablet (4 mg total) by mouth every 8 (eight) hours as needed for Nausea.    Food sensitivity with gastrointestinal symptoms  -     Allergy Referral - In Network       Recommend oral hydration, liquid diet and gradually resume solids as tolerated  Zofran for nausea  Return to clinic as needed  Pt verbalizes understanding, all questions/concerns addressed, in agreement w/plan        Follow up  Return if symptoms worsen or fail to improve.      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    Congenital absence of hand    left    Extrinsic asthma, unspecified    Osteoporosis    Left hip/sacrum   [2]   Past Surgical History:  Procedure Laterality Date    Other surgical history  adenoidectomy    Other surgical history  adenoidectomy   [3]   Social History  Socioeconomic History    Marital status: Single   Tobacco Use    Smoking status: Never     Passive exposure: Never    Smokeless tobacco: Never   Vaping Use    Vaping status: Never Used   Substance and Sexual Activity    Alcohol use: No    Drug use: No   Other Topics Concern    Caffeine Concern No    Exercise Yes     Comment: Marathon runner    Seat Belt No    Special Diet No    Stress Concern No    Weight Concern No   [4] History reviewed. No pertinent family history.  [5]   Current Outpatient  Medications on File Prior to Visit   Medication Sig Dispense Refill    sertraline 25 MG Oral Tab Take 1 tablet (25 mg total) by mouth daily. 90 tablet 3    naproxen 500 MG Oral Tab Take 1 tablet (500 mg total) by mouth in the morning and 1 tablet (500 mg total) in the evening. Take with meals.      albuterol 108 (90 Base) MCG/ACT Inhalation Aero Soln Inhale 1-2 puffs into the lungs every 4 (four) hours as needed for Wheezing or Shortness of Breath. (Patient not taking: Reported on 6/7/2025) 1 each 0     No current facility-administered medications on file prior to visit.

## 2025-06-23 NOTE — H&P
[facility] - Gastroenterology                                                                                                               Reason for consult: eval    Requesting physician or provider: Gay Junior MD    Chief Complaint   Patient presents with    Er F/u     Intestines were inflamed, follow up visit from ER       HPI:   Ema Mejias is a 25 year old year-old female with history of anxiety, asthma, osteoporosis:    she is here today for evaluation    Was having diarrhea, abd pain, nausea  No vomiting    Sx last a few days  She denies change in diet, activity, travel, ill contacts    Was having diarrhea 2 weeks before pain and nausea started.  No brbpr, melena.     No h/o symptoms.  Was in ed    Ct a/p 6/7/25  Impression   CONCLUSION:    1. Fluid-filled and prominent loops of nondilated small bowel.  The pattern can be seen with nonspecific enteritis.  2. No additional specific abnormality.  3. Details as above.  Continued clinical correlation recommended.      Hgb 11.9 6/7/25 - down from comparison  Ferritin low (5/2025)  Normal iron studies (5/2025)  Ttg, iga normal (7/2024)    Sx now resolved.  Has had mild pain after eating  No heavy periods  Is a runner and has been told ferritin low from running    she moves her bowels at baseline once daily. No diarrhea. No constipation. she denies brbpr and/or melena.    she denies acid reflux and/or heartburn. she denies dysphagia, odynophagia and/or globus.  she denies nausea and/or vomiting.  she denies recent change in appetite and/or unintentional weight loss.    NSAIDS: naproxen daily for inflamed pelvis   Tobacco: no  Alcohol: no  Marijuana: no  Illicit drugs: no    No FH GI malignancy, IBD, celiac    No history of adverse reaction to sedation  No ROWDY  No anticoagulants  No pacemaker/defibrillator  No pain medications and/or sleep aides      Last colonoscopy:  Last EGD:    Wt Readings from Last 6  Encounters:   06/24/25 129 lb (58.5 kg)   06/10/25 131 lb 3.2 oz (59.5 kg)   06/07/25 130 lb (59 kg)   05/28/25 131 lb 6.4 oz (59.6 kg)   12/08/24 126 lb (57.2 kg)   10/26/24 127 lb (57.6 kg)        History, Medications, Allergies, ROS:      Past Medical History[1]   Past Surgical History[2]   Family Hx: Family History[3]   Social History: Short Social Hx on File[4]     Medications (Active prior to today's visit):  Current Medications[5]    Allergies:  Allergies[6]    ROS:   CONSTITUTIONAL: negative for fevers, chills, sweats and weight loss  EYES Negative for red eyes, yellow eyes, changes in vision  HEENT: Negative for dysphagia and hoarseness  RESPIRATORY: Negative for cough and shortness of breath  CARDIOVASCULAR: Negative for chest pain, palpitations  GASTROINTESTINAL: See HPI  GENITOURINARY: Negative for dysuria and frequency  MUSCULOSKELETAL: Negative for arthralgias and myalgias  NEUROLOGICAL: Negative for dizziness and headaches  BEHAVIOR/PSYCH: Negative for anxiety and poor appetite    PHYSICAL EXAM:   Blood pressure 118/72, height 5' 6\" (1.676 m), weight 129 lb (58.5 kg), last menstrual period 05/31/2025.    GEN: WD/WN, NAD  HEENT: Supple symmetrical, trachea midline  CV: RRR, the extremities are warm and well perfused   LUNGS: No increased work of breathing  ABDOMEN: No scars, normal bowel sounds, soft, non-tender, non-distended no rebound or guarding, no masses, no hepatomegaly  MSK: No redness, no warmth, no swelling of joints  SKIN: No jaundice, no erythema, no rashes  HEMATOLOGIC: No bleeding, no bruising  NEURO: Alert and interactive, normal gait    Labs/Imaging/Procedures:     Patient's pertinent labs and imaging were reviewed and discussed with patient today.        .  ASSESSMENT/PLAN:   Ema Mejias is a 25 year old year-old female with history of anxiety, asthma, osteoporosis:    #enteritis  #VON  She is here today after having acute onset diarrhea with associated abd pain and nausea.  Duration of sx approx 2 weeks and w/o inciting event. Hs been on naproxen x last year.  W/U notable for non-specific enteritis and daniele on labs.  She denies heavy periods. She says lab findings historically attributed to running.  Sx resolved now and denies pattern/h/o sx.  Has had some mild post-prandial discomfort and think post-infectious.  No fhx gi malignancy, IBD, celiac.  Has not had cln and/or egd and think reasonable to exclude IBD/w/U anemia.      -consider taking prilosec otc while using nsaids on daily basis  -try to avoid/limit nsaids  -avoid/limit dairy, heavily seasoned foods  -mvi with iron otc    1. Schedule colonoscopy/egd with MAC w/ General pool MD [Diagnosis:daniele, enteritis ]    2.  bowel prep from pharmacy (split golytely)    3.   For cardiology patients and patients on blood thinners:  Please contact your cardiology clinic for clearance to proceed with the endoscopic procedure. If you are on blood thinners, please also confirm with your cardiologic clinic that you are able to hold the blood thinner per our recommendations.\"    BLOOD THINNER ORDERS:  -Hold for 48 hours (Xarelto, Eliquis, Pradaxa, Savaysa)  -Hold for 3 days (Pletal)  -Hold for 5 days (Coumadin, Plavix, Brilinta, Aggrenox)  -Hold for 7 days (Effient)     For endocrinology insulin patients:    Please contact your endocrinology clinic for insulin adjustment orders prior to your endoscopic procedure.    4. Read all bowel prep instructions carefully    5. AVOID seeds, nuts, popcorn, raw fruits and vegetables (cooked is okay) for 2-3 days before procedure    6.   If you start any NEW medication after your visit today, please notify us. Certain medications will need to be held before the procedure, or the procedure cannot be performed.     >>>Please note: if you were prescribed Suprep for the bowel prep and it is too expensive or not covered by insurance, it is okay to substitute Trilyte (or any similar generic prep). This can be  done by notifying the pharmacy or calling our office.         Orders This Visit:  No orders of the defined types were placed in this encounter.      Meds This Visit:  Requested Prescriptions     Signed Prescriptions Disp Refills    polyethylene glycol, PEG 3350-KCl-NaBcb-NaCl-NaSulf, 236 g Oral Recon Soln 4000 mL 0     Sig: Take 4,000 mL by mouth once for 1 dose.       Imaging & Referrals:  None    ENDOSCOPIC RISK BENEFIT DISCUSSION: I described the procedure in great detail with the patient. I discussed the risks and benefits, including but not limited to: bleeding, perforation, infection, anesthesia complications, and even death. Patient will be NPO after midnight and will have a person physically present at time of pick-up to drive patient home. Patient verbalized understanding and agrees to proceed with procedure as planned.    Jenniffer Leung, APRN   6/23/2025        This note was partially prepared using Dragon Medical voice recognition dictation software. As a result, errors may occur. When identified, these errors have been corrected. While every attempt is made to correct errors during dictation, discrepancies may still exist.          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 No pertinent family history.  [4]   Social History  Socioeconomic History    Marital status: Single   Tobacco Use    Smoking status: Never     Passive exposure: Never    Smokeless tobacco: Never   Vaping Use    Vaping status: Never Used   Substance and Sexual Activity    Alcohol use: No    Drug use: No   Other Topics Concern    Caffeine Concern No    Exercise Yes     Comment: Marathon runner    Seat Belt No    Special  Diet No    Stress Concern No    Weight Concern No     Social Drivers of Health     Food Insecurity: Low Risk  (9/4/2023)    Received from [facility]    Food Insecurity     Have there been times that your food ran out, and you didn't have money to get more?: No     Are there times that you worry that this might happen?: No   Transportation Needs: Low Risk  (9/4/2023)    Received from [facility]    Transportation Needs     Do you have trouble getting transportation to medical appointments?: No     How do you normally get to and from your appointments?: Other   [5]   Current Outpatient Medications   Medication Sig Dispense Refill    polyethylene glycol, PEG 3350-KCl-NaBcb-NaCl-NaSulf, 236 g Oral Recon Soln Take 4,000 mL by mouth once for 1 dose. 4000 mL 0    ondansetron (ZOFRAN) 4 mg tablet Take 1 tablet (4 mg total) by mouth every 8 (eight) hours as needed for Nausea. 20 tablet 2    sertraline 25 MG Oral Tab Take 1 tablet (25 mg total) by mouth daily. 90 tablet 3    albuterol 108 (90 Base) MCG/ACT Inhalation Aero Soln Inhale 1-2 puffs into the lungs every 4 (four) hours as needed for Wheezing or Shortness of Breath. (Patient not taking: Reported on 6/7/2025) 1 each 0    naproxen 500 MG Oral Tab Take 1 tablet (500 mg total) by mouth in the morning and 1 tablet (500 mg total) in the evening. Take with meals.     [6]   Allergies  Allergen Reactions    Penicillins HIVES

## 2025-06-24 ENCOUNTER — TELEPHONE (OUTPATIENT)
Facility: CLINIC | Age: 25
End: 2025-06-24

## 2025-06-24 ENCOUNTER — OFFICE VISIT (OUTPATIENT)
Facility: CLINIC | Age: 25
End: 2025-06-24
Payer: COMMERCIAL

## 2025-06-24 VITALS
WEIGHT: 129 LBS | DIASTOLIC BLOOD PRESSURE: 72 MMHG | BODY MASS INDEX: 20.73 KG/M2 | HEIGHT: 66 IN | SYSTOLIC BLOOD PRESSURE: 118 MMHG

## 2025-06-24 DIAGNOSIS — D50.9 IRON DEFICIENCY ANEMIA, UNSPECIFIED IRON DEFICIENCY ANEMIA TYPE: Primary | ICD-10-CM

## 2025-06-24 DIAGNOSIS — K52.9 ENTERITIS: ICD-10-CM

## 2025-06-24 PROCEDURE — 3008F BODY MASS INDEX DOCD: CPT | Performed by: NURSE PRACTITIONER

## 2025-06-24 PROCEDURE — 3078F DIAST BP <80 MM HG: CPT | Performed by: NURSE PRACTITIONER

## 2025-06-24 PROCEDURE — 99204 OFFICE O/P NEW MOD 45 MIN: CPT | Performed by: NURSE PRACTITIONER

## 2025-06-24 PROCEDURE — 3074F SYST BP LT 130 MM HG: CPT | Performed by: NURSE PRACTITIONER

## 2025-06-24 NOTE — TELEPHONE ENCOUNTER
Patient was seen in office today (6/24/2025) by VALERIE Abad. Provided patient with office number and prep instructions. Reviewed prep instructions with patient in office, verbalized understanding. Patient aware GI schedulers will call patient to schedule the procedure.      Procedure orders:    Schedule: Colonoscopy and Esophagogastroduodenoscopy (EGD) with [facility] Endoscopist     Diagnosis:     ICD-10-CM   1. Iron deficiency anemia, unspecified iron deficiency anemia type  D50.9   2. Enteritis  K52.9      Sedation: MAC   Prep: Split GoLytely    Medication changes prior to procedure:   None

## 2025-06-24 NOTE — PATIENT INSTRUCTIONS
-consider taking prilosec otc while using nsaids on daily basis  -try to avoid/limit nsaids  -avoid/limit dairy, heavily seasoned foods  -mvi with iron otc    1. Schedule colonoscopy/egd with [facility] w/ General pool MD [Diagnosis:daniele, enteritis ]    2.  bowel prep from pharmacy (split golytely)    3.   For cardiology patients and patients on blood thinners:  Please contact your cardiology clinic for clearance to proceed with the endoscopic procedure. If you are on blood thinners, please also confirm with your cardiologic clinic that you are able to hold the blood thinner per our recommendations.\"    BLOOD THINNER ORDERS:  -Hold for 48 hours (Xarelto, Eliquis, Pradaxa, Savaysa)  -Hold for 3 days (Pletal)  -Hold for 5 days (Coumadin, Plavix, Brilinta, Aggrenox)  -Hold for 7 days (Effient)     For endocrinology insulin patients:    Please contact your endocrinology clinic for insulin adjustment orders prior to your endoscopic procedure.    4. Read all bowel prep instructions carefully    5. AVOID seeds, nuts, popcorn, raw fruits and vegetables (cooked is okay) for 2-3 days before procedure    6.   If you start any NEW medication after your visit today, please notify us. Certain medications will need to be held before the procedure, or the procedure cannot be performed.     >>>Please note: if you were prescribed Suprep for the bowel prep and it is too expensive or not covered by insurance, it is okay to substitute Trilyte (or any similar generic prep). This can be done by notifying the pharmacy or calling our office.

## (undated) NOTE — LETTER
04/14/25          Ema Mejias  [address] [facility] [address]        Dear Ema Mejias    Our records indicate that you have outstanding lab work and/or testing that was ordered for you and has not yet been completed:    Lab Frequency Next Occurrence   TSH W Reflex To Free T4 Once 02/11/2025   Lipid Panel Once 02/11/2025   Comp Metabolic Panel (14) Once 02/11/2025   CBC With Differential With Platelet Once 02/11/2025   Iron And Tibc [E] Once 02/11/2025   Ferritin [E] Once 02/11/2025     To provide you with the best possible care, please complete these orders at your earliest convenience. If you have recently completed these orders please disregard this letter.      To schedule imaging, or outpatient tests please call Central Scheduling at [phone].      For any blood work needed, you can get this done at the [facility] in our [place] office anytime Monday-Friday from 7:30am-4:00pm and you do not need an appointment. They are closed for lunch between 12-1pm. They are closed Saturday and Sunday. If you need a time outside of these hours please call us to schedule an appointment.      *If you prefer to use [facility] for your labs please let us know so we can fax your orders.    Thank you,    [facility]